# Patient Record
Sex: FEMALE | URBAN - NONMETROPOLITAN AREA
[De-identification: names, ages, dates, MRNs, and addresses within clinical notes are randomized per-mention and may not be internally consistent; named-entity substitution may affect disease eponyms.]

---

## 2023-07-19 ENCOUNTER — OFFICE VISIT (OUTPATIENT)
Dept: PRIMARY CARE CLINIC | Age: 33
End: 2023-07-19
Payer: COMMERCIAL

## 2023-07-19 VITALS
DIASTOLIC BLOOD PRESSURE: 58 MMHG | WEIGHT: 142.25 LBS | OXYGEN SATURATION: 99 % | HEART RATE: 79 BPM | BODY MASS INDEX: 25.2 KG/M2 | SYSTOLIC BLOOD PRESSURE: 106 MMHG | TEMPERATURE: 98.1 F | HEIGHT: 63 IN | RESPIRATION RATE: 16 BRPM
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
## 2023-07-19 DIAGNOSIS — S20.161A TICK BITE OF RIGHT BREAST, INITIAL ENCOUNTER: Primary | ICD-10-CM

## 2023-07-19 PROCEDURE — 99203 OFFICE O/P NEW LOW 30 MIN: CPT

## 2023-07-19 RX ORDER — DOXYCYCLINE HYCLATE 100 MG
100 TABLET ORAL 2 TIMES DAILY
Qty: 20 TABLET | Refills: 0 | Status: SHIPPED | OUTPATIENT
Start: 2023-07-19 | End: 2023-07-29

## 2023-07-19 ASSESSMENT — ENCOUNTER SYMPTOMS
SWOLLEN GLANDS: 0
VOMITING: 0
COUGH: 0

## 2023-07-19 NOTE — PATIENT INSTRUCTIONS
Apply warm/cool compresses for 15-20 minutes 2 times a day   Doxycycline x 10 days  May rotate tylenol/ibuprofen for pain/discomfort  Return for continued or worsening symptoms

## 2024-04-16 DIAGNOSIS — S20.161A TICK BITE OF RIGHT BREAST, INITIAL ENCOUNTER: ICD-10-CM
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
## 2024-04-16 RX ORDER — DOXYCYCLINE HYCLATE 100 MG
TABLET ORAL
Qty: 20 TABLET | Refills: 0 | OUTPATIENT
Start: 2024-04-16

## 2024-05-21 DIAGNOSIS — S20.161A TICK BITE OF RIGHT BREAST, INITIAL ENCOUNTER: ICD-10-CM
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
## 2024-05-21 RX ORDER — DOXYCYCLINE HYCLATE 100 MG
TABLET ORAL
Qty: 20 TABLET | Refills: 0 | OUTPATIENT
Start: 2024-05-21